# Patient Record
Sex: MALE | Race: WHITE | HISPANIC OR LATINO | Employment: UNEMPLOYED | URBAN - METROPOLITAN AREA
[De-identification: names, ages, dates, MRNs, and addresses within clinical notes are randomized per-mention and may not be internally consistent; named-entity substitution may affect disease eponyms.]

---

## 2019-02-21 ENCOUNTER — HOSPITAL ENCOUNTER (EMERGENCY)
Facility: HOSPITAL | Age: 69
Discharge: HOME | End: 2019-02-22
Attending: INTERNAL MEDICINE
Payer: MEDICARE

## 2019-02-21 DIAGNOSIS — R31.9 HEMATURIA, UNSPECIFIED TYPE: ICD-10-CM

## 2019-02-21 DIAGNOSIS — R10.9 ACUTE RIGHT FLANK PAIN: Primary | ICD-10-CM

## 2019-02-21 LAB
BILIRUBIN, POC UA: NEGATIVE
BLOOD, POC UA: ABNORMAL
CLARITY, POC UA: CLEAR
COLOR, POC UA: YELLOW
GLUCOSE, POC UA: NEGATIVE
KETONES, POC UA: ABNORMAL
LEUKOCYTE EST, POC UA: NEGATIVE
NITRITE, POC UA: NEGATIVE
PH UR STRIP: 7.5 [PH]
PROTEIN, POC UA: ABNORMAL
SPECIFIC GRAVITY, POC UA: 1.02
UROBILINOGEN, POC UA: 1 E.U./DL

## 2019-02-21 PROCEDURE — 99284 EMERGENCY DEPT VISIT MOD MDM: CPT | Mod: 25,ER

## 2019-02-21 PROCEDURE — 81003 URINALYSIS AUTO W/O SCOPE: CPT | Mod: ER

## 2019-02-21 PROCEDURE — 63600175 PHARM REV CODE 636 W HCPCS: Mod: ER | Performed by: INTERNAL MEDICINE

## 2019-02-21 PROCEDURE — 96372 THER/PROPH/DIAG INJ SC/IM: CPT | Mod: ER

## 2019-02-21 RX ORDER — KETOROLAC TROMETHAMINE 30 MG/ML
60 INJECTION, SOLUTION INTRAMUSCULAR; INTRAVENOUS
Status: COMPLETED | OUTPATIENT
Start: 2019-02-21 | End: 2019-02-21

## 2019-02-21 RX ORDER — PROMETHAZINE HYDROCHLORIDE 25 MG/ML
25 INJECTION, SOLUTION INTRAMUSCULAR; INTRAVENOUS
Status: COMPLETED | OUTPATIENT
Start: 2019-02-21 | End: 2019-02-21

## 2019-02-21 RX ADMIN — KETOROLAC TROMETHAMINE 60 MG: 30 INJECTION, SOLUTION INTRAMUSCULAR at 11:02

## 2019-02-21 RX ADMIN — PROMETHAZINE HYDROCHLORIDE 25 MG: 25 INJECTION INTRAMUSCULAR; INTRAVENOUS at 11:02

## 2019-02-22 VITALS
HEIGHT: 67 IN | SYSTOLIC BLOOD PRESSURE: 141 MMHG | HEART RATE: 70 BPM | RESPIRATION RATE: 16 BRPM | TEMPERATURE: 98 F | WEIGHT: 172 LBS | BODY MASS INDEX: 27 KG/M2 | OXYGEN SATURATION: 100 % | DIASTOLIC BLOOD PRESSURE: 88 MMHG

## 2019-02-22 PROBLEM — R10.9 ACUTE RIGHT FLANK PAIN: Status: ACTIVE | Noted: 2019-02-22

## 2019-02-22 PROBLEM — R31.9 HEMATURIA: Status: ACTIVE | Noted: 2019-02-22

## 2019-02-22 RX ORDER — KETOROLAC TROMETHAMINE 10 MG/1
10 TABLET, FILM COATED ORAL 3 TIMES DAILY PRN
Qty: 10 TABLET | Refills: 0 | Status: SHIPPED | OUTPATIENT
Start: 2019-02-22

## 2019-02-22 NOTE — ED PROVIDER NOTES
Encounter Date: 2/21/2019    SCRIBE #1 NOTE: I, Karol Rhodes, am scribing for, and in the presence of,  Dr. Millard. I have scribed the following portions of the note - Other sections scribed: HPI, ROS, and PE.       History     Chief Complaint   Patient presents with    Flank Pain     pt presents to ER with c/o rt flank pain that started this afternoon,  Denies any nausea or vomiting or urinary symptoms.      Rufus Duvall is a 68 y.o. male who presents to the ED complaining of right flank pain that began today. Pt endorses nausea, hematuria, diaphoresis, and chills. Pt denies vomiting, dysuria, and difficulty urinating. Pt states he has no history of kidney stones.         The history is provided by the patient and a relative. A  was used.     Review of patient's allergies indicates:  No Known Allergies  History reviewed. No pertinent past medical history.  History reviewed. No pertinent surgical history.  No family history on file.  Social History     Tobacco Use    Smoking status: Never Smoker    Smokeless tobacco: Never Used   Substance Use Topics    Alcohol use: No     Frequency: Never    Drug use: No     Review of Systems   Constitutional: Positive for chills and diaphoresis. Negative for fever.   HENT: Negative for sore throat.    Respiratory: Negative for shortness of breath.    Cardiovascular: Negative for chest pain.   Gastrointestinal: Positive for abdominal pain (right flank) and nausea. Negative for vomiting.   Genitourinary: Positive for hematuria. Negative for difficulty urinating and dysuria.   Musculoskeletal: Negative for back pain.   Skin: Negative for rash.   Neurological: Negative for weakness.   Hematological: Does not bruise/bleed easily.   All other systems reviewed and are negative.      Physical Exam     Initial Vitals [02/21/19 2212]   BP Pulse Resp Temp SpO2   (!) 145/92 74 18 98.1 °F (36.7 °C) 98 %      MAP       --         Physical Exam    Nursing note and vitals  reviewed.  Constitutional: He appears well-developed and well-nourished. He is not diaphoretic. No distress.   HENT:   Head: Normocephalic and atraumatic.   Eyes: Conjunctivae and EOM are normal. Pupils are equal, round, and reactive to light.   Cardiovascular: Normal rate, regular rhythm and normal heart sounds. Exam reveals no gallop and no friction rub.    No murmur heard.  Pulmonary/Chest: Breath sounds normal. He has no wheezes. He has no rhonchi. He has no rales.   Abdominal: Soft. There is CVA tenderness.   Neurological: He is alert and oriented to person, place, and time. GCS score is 15. GCS eye subscore is 4. GCS verbal subscore is 5. GCS motor subscore is 6.   Skin: Skin is warm and dry.         ED Course   Procedures  Labs Reviewed   POCT URINALYSIS W/O SCOPE - Abnormal; Notable for the following components:       Result Value    Glucose, UA Negative (*)     Bilirubin, UA Negative (*)     Ketones, UA 4+ (*)     Blood, UA Trace-intact (*)     Protein, UA 1+ (*)     Nitrite, UA Negative (*)     Leukocytes, UA Negative (*)     All other components within normal limits   POCT URINALYSIS W/O SCOPE          Imaging Results          CT Renal Stone Study ABD Pelvis WO (Final result)  Result time 02/21/19 23:42:21    Final result by Dwight Valerio MD (02/21/19 23:42:21)                 Impression:      No evidence of nephrolithiasis or obstructive uropathy.  Outpatient CT urogram may be obtained evaluation the patient with unexplained hematuria.    Enlargement of the prostate gland with mild circumferential wall thickening involving the urinary bladder.    Colonic diverticula without evidence of acute diverticulitis.    Additional findings as above.      Electronically signed by: Dwight Valerio MD  Date:    02/21/2019  Time:    23:42             Narrative:    EXAMINATION:  CT RENAL STONE STUDY ABD PELVIS WO    CLINICAL HISTORY:  Flank pain, stone disease suspected;Hematuria;    TECHNIQUE:  Axial CT scan of the  abdomen and pelvis was obtained from the level of the hemidiaphragms to the pubic symphysis without intravenous contrast. Coronal and sagittal reformats were obtained. The study was performed using a renal stone protocol.  Therefore, no intravenous or oral IV contrast was administered.    COMPARISON:  None.    FINDINGS:  There are no pleural effusions.  There is no evidence of a pneumothorax.  There are changes of centralobular emphysema.  No discrete pulmonary nodule is identified.    The heart is unremarkable.  There is normal tapering of the abdominal aorta.  There are calcifications along the course of the infrarenal abdominal aorta.  Noncontrast assessment of the remainder of the vessels are unremarkable.  There is no evidence of lymphadenopathy in the abdomen or pelvis.    The esophagus is unremarkable.  There is mild distention of the stomach.  The duodenum is within normal limits.  There is some fecalization of the mid small bowel loops.  The small bowel loops are otherwise unremarkable.  There is colonic diverticula without evidence of acute diverticulitis.  There is redundancy of the sigmoid colon.    The liver is unremarkable.  The gallbladder is within normal limits.  The biliary tree is within normal limits.  The spleen is within normal limits.  The pancreas is unremarkable.    The adrenal glands are unremarkable.  The kidneys are unremarkable.  There is no evidence of nephrolithiasis.  The ureters are unremarkable.  There is mild circumferential wall thickening involving the urinary bladder.  The prostate gland is enlarged.    There is no evidence of free fluid in the abdomen or pelvis.  There is no evidence of free air.  There is no evidence of pneumatosis.    The psoas margins are unremarkable.  There are bilateral fat containing inguinal hernias with left greater than right.  No inflammatory changes are identified.  The remainder of the abdominal wall is within normal limits.  There are nonspecific  sclerotic lesion involving the left sacral and iliac bones.  There is a chronic appearing fracture involving the superior endplate of the L4 vertebral body.                                 Medical Decision Making:   History:   Old Medical Records: I decided to obtain old medical records.  Initial Assessment:   Rufus Duvall is a 68 y.o. male who presents to the ED complaining of right flank pain that began today.   Clinical Tests:   Lab Tests: Ordered and Reviewed  Radiological Study: Ordered and Reviewed            Scribe Attestation:   Scribe #1: I performed the above scribed service and the documentation accurately describes the services I performed. I attest to the accuracy of the note.    This document was produced by a scribe under my direction and in my presence. I agree with the content of the note and have made any necessary edits.     Dr. Millard    02/22/2019 3:46 AM             Clinical Impression:     1. Acute right flank pain    2. Hematuria, unspecified type            Disposition:   Disposition: Discharged  Condition: Stable                        Sherwin Millard MD  02/22/19 0347

## 2024-06-05 ENCOUNTER — HOSPITAL ENCOUNTER (EMERGENCY)
Facility: HOSPITAL | Age: 74
Discharge: HOME | End: 2024-06-05
Attending: EMERGENCY MEDICINE
Payer: MEDICARE

## 2024-06-05 VITALS
WEIGHT: 175 LBS | TEMPERATURE: 98 F | SYSTOLIC BLOOD PRESSURE: 122 MMHG | DIASTOLIC BLOOD PRESSURE: 79 MMHG | BODY MASS INDEX: 29.16 KG/M2 | RESPIRATION RATE: 18 BRPM | HEIGHT: 65 IN | OXYGEN SATURATION: 96 % | HEART RATE: 85 BPM

## 2024-06-05 DIAGNOSIS — S93.401A SPRAIN OF RIGHT ANKLE, UNSPECIFIED LIGAMENT, INITIAL ENCOUNTER: Primary | ICD-10-CM

## 2024-06-05 DIAGNOSIS — T14.90XA INJURY: ICD-10-CM

## 2024-06-05 PROCEDURE — 99283 EMERGENCY DEPT VISIT LOW MDM: CPT | Mod: 25

## 2024-06-05 PROCEDURE — 25000003 PHARM REV CODE 250

## 2024-06-05 RX ORDER — ACETAMINOPHEN 325 MG/1
650 TABLET ORAL
Status: COMPLETED | OUTPATIENT
Start: 2024-06-05 | End: 2024-06-05

## 2024-06-05 RX ADMIN — ACETAMINOPHEN 650 MG: 325 TABLET ORAL at 09:06

## 2024-06-05 NOTE — ED TRIAGE NOTES
Rufus Duvall, a 73 y.o. male presents to the ED w/ complaint of right ankle pain 8/10.  Pain upon bearing weight.  Twisted it playing soccer. Last took tylenol around 4am today    Triage note:  Chief Complaint   Patient presents with    Ankle Pain     Started today played soccer yest, can't recall injury     Review of patient's allergies indicates:  No Known Allergies  No past medical history on file.

## 2024-06-05 NOTE — DISCHARGE INSTRUCTIONS
No fracture of dislocation   Provided with walking boot due to pain with walking  When at home elevate leg, apply ice and wrap ace bandage to help with spelling   Follow up with orthopedics in 2 weeks   Continue tylenol as needed for pain

## 2024-06-05 NOTE — ED PROVIDER NOTES
Encounter Date: 6/5/2024       History     Chief Complaint   Patient presents with    Ankle Pain     Started today played soccer yest, can't recall injury     A 73-year-old male with no known medical history presents to the emergency department with right ankle pain, which began last night after playing soccer during the day. There was no fall or specific injury reported, besides repetitively kicking the ball. The patient's daughter is present and assists in providing history. She states that yesterday, the swelling was worse with bruising, but it seems improved now. The patient is still having difficulty with weight-bearing. No fall was reported. No numbness or tingling.       Review of patient's allergies indicates:  No Known Allergies  History reviewed. No pertinent past medical history.  History reviewed. No pertinent surgical history.  No family history on file.  Social History     Tobacco Use    Smokeless tobacco: Never   Substance Use Topics    Alcohol use: No    Drug use: No     Review of Systems  See HPI   Physical Exam     Initial Vitals [06/05/24 0801]   BP Pulse Resp Temp SpO2   133/75 96 20 97.9 °F (36.6 °C) 99 %      MAP       --         Physical Exam    Vitals reviewed.  Constitutional: He appears well-developed.   HENT:   Head: Normocephalic and atraumatic.   Eyes: Conjunctivae and EOM are normal.   Neck:   Normal range of motion.  Cardiovascular:  Normal rate.           Pulmonary/Chest: No respiratory distress.   Abdominal: Abdomen is soft. He exhibits no distension. There is no abdominal tenderness. There is no rebound.   Musculoskeletal:         General: Tenderness present. Normal range of motion.      Cervical back: Normal range of motion.      Comments: Right ankle:    Edema of the the right foot with swelling over the lateral malleolus   Pain with dorsi flexion, no discomfort with planter flexion   Compartments soft   Neurovascular intact, DP palpable      Neurological: He is alert and oriented  to person, place, and time.   Skin: Skin is warm and dry.   Psychiatric: He has a normal mood and affect. Thought content normal.         ED Course   Procedures  Labs Reviewed - No data to display       Imaging Results              X-Ray Ankle Complete Right (Final result)  Result time 06/05/24 11:29:55      Final result by Carlos Montana MD (06/05/24 11:29:55)                   Impression:      Ankle soft tissue edema.  No acute displaced fracture.      Electronically signed by: Carlos Montana MD  Date:    06/05/2024  Time:    11:29               Narrative:    EXAMINATION:  XR ANKLE COMPLETE 3 VIEW RIGHT    CLINICAL HISTORY:  Injury, unspecified, initial encounter.    TECHNIQUE:  AP, lateral, and oblique images of the right ankle were performed.    COMPARISON:  None.    FINDINGS:  No acute displaced fracture.  No dislocation.  Chronic appearing calcifications along the superior aspect of the calcaneus.  Ankle soft tissue edema.  Degenerative changes in the ankle and midfoot.  No unexpected radiopaque foreign body.  Atherosclerotic vascular calcifications.                                       Medications   acetaminophen tablet 650 mg (650 mg Oral Given 6/5/24 0906)     Medical Decision Making   73-year-old male presents emergency department right ankle pain and swelling after pain soccer yesterday.  X-ray imaging negative for fracture /dislocation.  Pain overlying the right lateral malleolus consistent with ankle sprain likely due to repetitive movements while playing soccer.  Due to difficulty with ambulation patient placed in walking boot with outpatient referral to Orthopedics.  Discussed with patient return precautions discharged home    Amount and/or Complexity of Data Reviewed  Radiology: ordered.    Risk  OTC drugs.                                      Clinical Impression:  Final diagnoses:  [T14.90XA] Injury  [S93.401A] Sprain of right ankle, unspecified ligament, initial encounter (Primary)                  Rachael Storey, YVES  06/05/24 4789